# Patient Record
Sex: MALE | Race: WHITE | NOT HISPANIC OR LATINO | Employment: FULL TIME | URBAN - METROPOLITAN AREA
[De-identification: names, ages, dates, MRNs, and addresses within clinical notes are randomized per-mention and may not be internally consistent; named-entity substitution may affect disease eponyms.]

---

## 2021-06-16 PROBLEM — R11.2 NAUSEA & VOMITING: Status: ACTIVE | Noted: 2019-01-04

## 2021-06-16 PROBLEM — R00.0 TACHYCARDIA: Status: ACTIVE | Noted: 2019-01-04

## 2022-11-20 ENCOUNTER — APPOINTMENT (OUTPATIENT)
Dept: CT IMAGING | Facility: HOSPITAL | Age: 47
End: 2022-11-20
Attending: EMERGENCY MEDICINE
Payer: MEDICAID

## 2022-11-20 ENCOUNTER — APPOINTMENT (OUTPATIENT)
Dept: RADIOLOGY | Facility: HOSPITAL | Age: 47
End: 2022-11-20
Attending: EMERGENCY MEDICINE
Payer: MEDICAID

## 2022-11-20 ENCOUNTER — HOSPITAL ENCOUNTER (EMERGENCY)
Facility: HOSPITAL | Age: 47
Discharge: HOME | End: 2022-11-20
Attending: EMERGENCY MEDICINE | Admitting: EMERGENCY MEDICINE
Payer: MEDICAID

## 2022-11-20 VITALS
TEMPERATURE: 98 F | OXYGEN SATURATION: 94 % | HEART RATE: 88 BPM | DIASTOLIC BLOOD PRESSURE: 76 MMHG | HEIGHT: 70 IN | SYSTOLIC BLOOD PRESSURE: 122 MMHG | BODY MASS INDEX: 40.09 KG/M2 | RESPIRATION RATE: 16 BRPM | WEIGHT: 280 LBS
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
## 2022-11-20 DIAGNOSIS — F10.920 ALCOHOLIC INTOXICATION WITHOUT COMPLICATION (H): ICD-10-CM

## 2022-11-20 DIAGNOSIS — S92.325A CLOSED NONDISPLACED FRACTURE OF SECOND METATARSAL BONE OF LEFT FOOT, INITIAL ENCOUNTER: ICD-10-CM

## 2022-11-20 PROBLEM — E78.00 PURE HYPERCHOLESTEROLEMIA: Status: ACTIVE | Noted: 2019-03-11

## 2022-11-20 PROBLEM — G47.33 OSA (OBSTRUCTIVE SLEEP APNEA): Status: ACTIVE | Noted: 2022-07-28

## 2022-11-20 PROBLEM — F31.9 BIPOLAR 1 DISORDER (H): Status: ACTIVE | Noted: 2021-06-05

## 2022-11-20 PROBLEM — F41.1 GAD (GENERALIZED ANXIETY DISORDER): Status: ACTIVE | Noted: 2017-02-07

## 2022-11-20 PROBLEM — F33.1 MAJOR DEPRESSIVE DISORDER, RECURRENT EPISODE, MODERATE WITH MIXED FEATURES (H): Status: ACTIVE | Noted: 2021-06-17

## 2022-11-20 PROCEDURE — 71046 X-RAY EXAM CHEST 2 VIEWS: CPT

## 2022-11-20 PROCEDURE — 250N000013 HC RX MED GY IP 250 OP 250 PS 637: Performed by: EMERGENCY MEDICINE

## 2022-11-20 PROCEDURE — 73590 X-RAY EXAM OF LOWER LEG: CPT | Mod: LT

## 2022-11-20 PROCEDURE — 73630 X-RAY EXAM OF FOOT: CPT | Mod: LT

## 2022-11-20 PROCEDURE — 28470 CLTX METATARSAL FX WO MNP EA: CPT | Mod: LT

## 2022-11-20 PROCEDURE — 73610 X-RAY EXAM OF ANKLE: CPT | Mod: LT

## 2022-11-20 PROCEDURE — 72125 CT NECK SPINE W/O DYE: CPT

## 2022-11-20 PROCEDURE — 70450 CT HEAD/BRAIN W/O DYE: CPT

## 2022-11-20 PROCEDURE — 99285 EMERGENCY DEPT VISIT HI MDM: CPT | Mod: 25

## 2022-11-20 RX ORDER — IBUPROFEN 600 MG/1
600 TABLET, FILM COATED ORAL ONCE
Status: COMPLETED | OUTPATIENT
Start: 2022-11-20 | End: 2022-11-20

## 2022-11-20 RX ADMIN — IBUPROFEN 600 MG: 600 TABLET, FILM COATED ORAL at 02:33

## 2022-11-20 ASSESSMENT — ACTIVITIES OF DAILY LIVING (ADL)
ADLS_ACUITY_SCORE: 35
ADLS_ACUITY_SCORE: 35

## 2022-11-20 NOTE — ED NOTES
Verbalizes readiness to discharge. Discharge instructions reviewed, questions answered. Verbalizes understanding of CAM boot, crutches, non weightbearing status, and follow up with Ortho via teach back method.
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
## 2022-11-20 NOTE — DISCHARGE INSTRUCTIONS
You broke your foot.  There is also a possible break in the ankle.  Wear the cam boot.  Nonweightbearing.  Crutches for ambulation.  Tylenol, ibuprofen as needed for pain.
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
## 2023-04-11 ENCOUNTER — APPOINTMENT (OUTPATIENT)
Dept: RADIOLOGY | Facility: HOSPITAL | Age: 48
End: 2023-04-11
Attending: EMERGENCY MEDICINE
Payer: MEDICAID

## 2023-04-11 ENCOUNTER — HOSPITAL ENCOUNTER (EMERGENCY)
Facility: HOSPITAL | Age: 48
Discharge: HOME | End: 2023-04-11
Admitting: EMERGENCY MEDICINE
Payer: MEDICAID

## 2023-04-11 VITALS
HEIGHT: 70 IN | DIASTOLIC BLOOD PRESSURE: 78 MMHG | TEMPERATURE: 98 F | HEART RATE: 79 BPM | SYSTOLIC BLOOD PRESSURE: 133 MMHG | OXYGEN SATURATION: 96 % | WEIGHT: 292 LBS | RESPIRATION RATE: 18 BRPM | BODY MASS INDEX: 41.8 KG/M2
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
## 2023-04-11 DIAGNOSIS — S89.92XA INJURY OF LEFT KNEE, INITIAL ENCOUNTER: ICD-10-CM

## 2023-04-11 PROCEDURE — 250N000013 HC RX MED GY IP 250 OP 250 PS 637: Performed by: EMERGENCY MEDICINE

## 2023-04-11 PROCEDURE — 73560 X-RAY EXAM OF KNEE 1 OR 2: CPT | Mod: LT

## 2023-04-11 PROCEDURE — 99284 EMERGENCY DEPT VISIT MOD MDM: CPT

## 2023-04-11 PROCEDURE — 73590 X-RAY EXAM OF LOWER LEG: CPT | Mod: LT

## 2023-04-11 RX ORDER — ACETAMINOPHEN 500 MG
500-1000 TABLET ORAL EVERY 6 HOURS PRN
Qty: 30 TABLET | Refills: 0 | Status: SHIPPED | OUTPATIENT
Start: 2023-04-11

## 2023-04-11 RX ORDER — IBUPROFEN 600 MG/1
600 TABLET, FILM COATED ORAL EVERY 6 HOURS PRN
Qty: 30 TABLET | Refills: 0 | Status: SHIPPED | OUTPATIENT
Start: 2023-04-11

## 2023-04-11 RX ORDER — HYDROCODONE BITARTRATE AND ACETAMINOPHEN 5; 325 MG/1; MG/1
1 TABLET ORAL ONCE
Status: COMPLETED | OUTPATIENT
Start: 2023-04-11 | End: 2023-04-11

## 2023-04-11 RX ADMIN — HYDROCODONE BITARTRATE AND ACETAMINOPHEN 1 TABLET: 5; 325 TABLET ORAL at 18:07

## 2023-04-11 ASSESSMENT — ACTIVITIES OF DAILY LIVING (ADL): ADLS_ACUITY_SCORE: 35

## 2023-04-11 NOTE — ED TRIAGE NOTES
Pt reports extensive history of issues with his L leg, is going through PT, last night fell on L knee pt states he heard a pop.  Tried to sleep it off and rest it and was unable to walk on it today.  Called EMS d/t not being able to ambulate.  Pain 5/10 when at rest, with movement goes to a 10/10.  Some swelling noted.  Pt reports the pain radiates to the rest of the leg.      Triage Assessment     Row Name 04/11/23 1710       Triage Assessment (Adult)    Airway WDL WDL       Respiratory WDL    Respiratory WDL WDL       Skin Circulation/Temperature WDL    Skin Circulation/Temperature WDL WDL       Cardiac WDL    Cardiac WDL WDL       Peripheral/Neurovascular WDL    Peripheral Neurovascular WDL WDL       Cognitive/Neuro/Behavioral WDL    Cognitive/Neuro/Behavioral WDL WDL
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
## 2023-04-12 NOTE — ED PROVIDER NOTES
EMERGENCY DEPARTMENT ENCOUNTER      NAME: Chaim Robles  AGE: 48 year old male  YOB: 1975  MRN: [id number]  EVALUATION DATE & TIME: 4/11/2023  6:31 PM    PCP: Boby Newman    ED PROVIDER: Cornelia Vega PA-C      Chief Complaint   Patient presents with     Knee Injury     Knee Pain         FINAL IMPRESSION:  1. Injury of left knee, initial encounter    2. Fall, initial encounter          MEDICAL DECISION MAKING:    Pertinent Labs & Imaging studies reviewed. (See chart for details)  48 year old male presents to the Emergency Department for evaluation of left knee pain.  The patient has been following with [facility] due to an ACL injury as well as fracture in his foot/ankle occurring back in November.  He has been seeing physical therapy however, his knee pain and strength has not been improving.  Unfortunately, last night his knee gave out underneath him and caused him to fall and fell directly onto his left knee.  Since then, he has had excruciating pain and swelling has not been able to bear weight.  He called EMS and was brought to the emergency department.  On my evaluation, the patient was vitally stable and well-appearing. Examination with 2+ DP and PT pulses. Small effusion of the left knee without significant ecchymosis, redness or warmth.  Normal range of motion of the left ankle and hip.  Tenderness to palpation to the medial aspect of the left knee.  Tenderness palpation of the tibial plateau and distal femur.  Normal strength of the left lower extremity.  Decreased range of motion of the left knee due to pain.  Differential diagnosis included, but is not limited to, septic joint, fracture, dislocation, meniscal injury, ligamentous injury.    Fortunately, patient has not had any fevers, chills and examination without any erythema or warmth concerning for septic joint.  With the trauma, I did obtain x-rays which were fortunately negative for fracture or dislocation.  At this time,  with the small joint effusion and injury and previous ACL injury I do feel that he likely has a ligamentous injury versus meniscal injury.  I recommended nonemergent MRI and close follow-up with [facility] to do this.  He is currently following with [facility] and will call them tomorrow to let them know that he needs follow-up with repeat MRI.  He was given a dose of hydrocodone here in the emergency department with improvement of his pain.  We discussed that I will not be sending home with any narcotic pain medications as he does not have any acute fracture and he is in agreement and understanding.  I recommended treatment of pain with Tylenol 1000 mg ibuprofen 600 mg every 6 hours as needed.  I also recommended rest, ice, elevation and compression.  He does have a knee immobilizer provided to him by [facility] due to his previous ACL injury on this leg.  Knee immobilizer was reapplied and I instructed him to remain nonweightbearing and use his crutches.  He was in agreement and understanding with the plan of care.  Return precautions were discussed and he will call [facility] for follow-up.  All questions were answered the best my ability and he was discharged in the emergency department stable condition.    Medical Decision Making    History:    Supplemental history from: Documented in chart, if applicable    External Record(s) reviewed: Documented in chart, if applicable.    Work Up:    Chart documentation includes differential considered and any EKGs or imaging independently interpreted by provider, where specified.    In additional to work up documented, I considered the following work up: Documented in chart, if applicable.    External consultation:    Discussion of management with another provider: Documented in chart, if applicable    Complicating factors:    Care impacted by chronic illness: Diabetes, hypertension, mental illness.    Care affected by social determinants of  health: N/A    Disposition considerations: Discharge. I prescribed additional prescription strength medication(s) as charted. See documentation for any additional details.         ED COURSE:  5:25 PM I met with the patient, obtained history, performed an initial exam, and discussed options and plan for diagnostics and treatment here in the ED.    6:35 PM Patient discharged after being provided with extensive anticipatory guidance and given return precautions, importance of PCP follow-up emphasized.    At the conclusion of the encounter I discussed the results of all of the tests and the disposition. The questions were answered. The patient or family acknowledged understanding and was agreeable with the care plan.     MEDICATIONS GIVEN IN THE EMERGENCY:  Medications   HYDROcodone-acetaminophen (NORCO) 5-325 MG per tablet 1 tablet (1 tablet Oral $Given 4/11/23 1807)       NEW PRESCRIPTIONS STARTED AT TODAY'S ER VISIT  Discharge Medication List as of 4/11/2023  6:42 PM      START taking these medications    Details   acetaminophen (TYLENOL) 500 MG tablet Take 1-2 tablets (500-1,000 mg) by mouth every 6 hours as needed for mild pain, Disp-30 tablet, R-0, Local Print      ibuprofen (ADVIL/MOTRIN) 600 MG tablet Take 1 tablet (600 mg) by mouth every 6 hours as needed for moderate pain, Disp-30 tablet, R-0, Local Print                  =================================================================    HPI:    Patient information was obtained from: The patient    Use of Interpretor: N/A         Chaim ANAYA Travis is a 48 year old male with a pertinent history of bipolar disorder, type 2 diabetes, hypertension, hyperlipidemia, HIRAL, obesity, generalized anxiety disorder who presents to this ED via EMS for evaluation of left knee pain.  The patient has been following with [facility] due to an ACL injury occurring back in November.  He has been seeing physical therapy for this however, yesterday his knee gave out  underneath him causing him to fall.  He reports that his entire weight fell onto his left knee and he heard a pop at this time.  Since, he has had excruciating pain and swelling in the area and was concerned so he presented to the emergency department.  On my evaluation, the patient denies any pain in his left hip or ankle.  He has not been able to bear weight on the left knee due to significant pain.  He states that his leg feels somewhat weak, but mostly due to the severe pain he is in.  He denies hitting his head or passing out.  He denies any other injuries.  He does not voice any other concerns at this time.      REVIEW OF SYSTEMS:  Negative unless otherwise stated in the above HPI.      PAST MEDICAL HISTORY:  Past Medical History:   Diagnosis Date     Bipolar disorder (H)      Diabetes (H)      HLD (hyperlipidemia)      Hypertension      Obesity      HIRAL (obstructive sleep apnea)        PAST SURGICAL HISTORY:  No past surgical history on file.        CURRENT MEDICATIONS:    No current facility-administered medications for this encounter.    Current Outpatient Medications:      acetaminophen (TYLENOL) 500 MG tablet, Take 1-2 tablets (500-1,000 mg) by mouth every 6 hours as needed for mild pain, Disp: 30 tablet, Rfl: 0     ibuprofen (ADVIL/MOTRIN) 600 MG tablet, Take 1 tablet (600 mg) by mouth every 6 hours as needed for moderate pain, Disp: 30 tablet, Rfl: 0     ALPRAZolam (XANAX) 1 MG tablet, [ALPRAZOLAM (XANAX) 1 MG TABLET] Take 1 mg by mouth at bedtime as needed. Severe anxiety, Disp: , Rfl: 0     amitriptyline (ELAVIL) 25 MG tablet, [AMITRIPTYLINE (ELAVIL) 25 MG TABLET] Take 25 mg by mouth at bedtime., Disp: , Rfl: 0     atorvastatin (LIPITOR) 20 MG tablet, [ATORVASTATIN (LIPITOR) 20 MG TABLET] Take 20 mg by mouth every morning.       , Disp: , Rfl: 1     citalopram (CELEXA) 40 MG tablet, [CITALOPRAM (CELEXA) 40 MG TABLET] Take 40 mg by mouth daily as needed.       , Disp: , Rfl: 3     glipiZIDE (GLUCOTROL  "XL) 10 MG 24 hr tablet, [GLIPIZIDE (GLUCOTROL XL) 10 MG 24 HR TABLET] Take 20 mg by mouth daily., Disp: , Rfl: 0     hydrOXYzine HCl (ATARAX) 25 MG tablet, [HYDROXYZINE HCL (ATARAX) 25 MG TABLET] Take 25 mg by mouth at bedtime as needed.       , Disp: , Rfl: 2     LEVEMIR FLEXTOUCH U-100 INSULN 100 unit/mL (3 mL) pen, [LEVEMIR FLEXTOUCH U-100 INSULN 100 UNIT/ML (3 ML) PEN] Inject 50 Units under the skin 2 (two) times a day as needed. Takes 50 units in the morning scheduled, takes 50 units at night if needed.      , Disp: , Rfl: 6     lisinopril-hydrochlorothiazide (PRINZIDE,ZESTORETIC) 20-25 mg per tablet, [LISINOPRIL-HYDROCHLOROTHIAZIDE (PRINZIDE,ZESTORETIC) 20-25 MG PER TABLET] Take 1 tablet by mouth daily., Disp: , Rfl: 1     LORazepam (ATIVAN) 0.5 MG tablet, [LORAZEPAM (ATIVAN) 0.5 MG TABLET] Take 0.5 mg by mouth 2 (two) times a day as needed., Disp: , Rfl: 0     metoprolol succinate (TOPROL-XL) 100 MG 24 hr tablet, [METOPROLOL SUCCINATE (TOPROL-XL) 100 MG 24 HR TABLET] Take 100 mg by mouth daily., Disp: , Rfl: 1     omeprazole (PRILOSEC) 20 MG capsule, [OMEPRAZOLE (PRILOSEC) 20 MG CAPSULE] Take 20 mg by mouth Daily before breakfast., Disp: , Rfl: 0     TRADJENTA 5 mg Tab, [TRADJENTA 5 MG TAB] Take 1 tablet by mouth daily., Disp: , Rfl: 0      ALLERGIES:  Allergies   Allergen Reactions     Metformin Unknown     Causes immediate vomiting.      Simvastatin Other (See Comments)     Had funny taste in his mouth       FAMILY HISTORY:  No family history on file.    SOCIAL HISTORY:   Social History     Socioeconomic History     Marital status: Single   Tobacco Use     Smoking status: Former     Years: 5.00     Types: Cigarettes     Smokeless tobacco: Never   Substance and Sexual Activity     Alcohol use: No     Drug use: No       VITALS:  Patient Vitals for the past 24 hrs:   BP Temp Pulse Resp SpO2 Height Weight   04/11/23 1725 133/78 98  F (36.7  C) 79 18 96 % 1.778 m (5' 10\") 132.5 kg (292 lb)       PHYSICAL EXAM  "   Constitutional: Well developed, Well nourished, NAD  HENT: Normocephalic, Atraumatic, Bilateral external ears normal, mask in place.  Neck: Normal range of motion, No tenderness, Supple, No stridor.  Eyes: Eyes track normally throughout exam, Conjunctiva normal, No discharge.   Respiratory: Speaks full sentences easily. No cough.  Cardiovascular: Heart rate and blood pressure as above.  GI: Soft, No tenderness, No masses, No flank tenderness. No rebound or guarding.  Musculoskeletal: 2+ DP and PT pulses. Small effusion of the left knee without significant ecchymosis, redness or warmth.  Normal range of motion of the left ankle and hip.  Tenderness to palpation to the medial aspect of the left knee.  Tenderness palpation of the tibial plateau and distal femur.  Normal strength of the left lower extremity.  Decreased range of motion of the left knee due to pain.  Integument: Warm, Dry, No erythema, No rash. No petechiae.  Neurologic: GCS 15.  Alert & oriented x 3, Normal motor function, Normal sensory function, No focal deficits noted. Normal gait.  Psychiatric: Affect normal, Judgment normal, Mood normal. Cooperative.    LAB:  All pertinent labs reviewed and interpreted.  No results found for this or any previous visit (from the past 24 hour(s)).      RADIOLOGY:  Reviewed all pertinent imaging. Please see official radiology report.  XR Knee Left 1/2 Views   Final Result   IMPRESSION: There is a knee joint effusion. Consider nonemergent MRI for further evaluation if there is concern for internal derangement.      No acute fracture of the left knee, tibia or fibula. Joint spaces of the knee are maintained. Small area of linear chronic-appearing bone formation distal tibia.      XR Tibia and Fibula Left 2 Views   Final Result   IMPRESSION: There is a knee joint effusion. Consider nonemergent MRI for further evaluation if there is concern for internal derangement.      No acute fracture of the left knee, tibia or  fibula. Joint spaces of the knee are maintained. Small area of linear chronic-appearing bone formation distal tibia.          Cornelia Vega PA-C  Emergency Medicine  [facility]  4/11/2023      Cornelia Vega PA-C  04/11/23 1924